# Patient Record
Sex: MALE | Race: OTHER | HISPANIC OR LATINO | URBAN - METROPOLITAN AREA
[De-identification: names, ages, dates, MRNs, and addresses within clinical notes are randomized per-mention and may not be internally consistent; named-entity substitution may affect disease eponyms.]

---

## 2019-07-01 ENCOUNTER — OUTPATIENT (OUTPATIENT)
Dept: OUTPATIENT SERVICES | Facility: HOSPITAL | Age: 19
LOS: 1 days | End: 2019-07-01
Payer: MEDICAID

## 2019-07-01 PROCEDURE — G9001: CPT

## 2019-07-03 DIAGNOSIS — Z71.89 OTHER SPECIFIED COUNSELING: ICD-10-CM

## 2019-07-31 ENCOUNTER — OUTPATIENT (OUTPATIENT)
Dept: OUTPATIENT SERVICES | Facility: HOSPITAL | Age: 19
LOS: 1 days | Discharge: HOME | End: 2019-07-31
Payer: MEDICAID

## 2019-07-31 VITALS
HEIGHT: 69 IN | WEIGHT: 154.32 LBS | TEMPERATURE: 99 F | SYSTOLIC BLOOD PRESSURE: 116 MMHG | OXYGEN SATURATION: 97 % | DIASTOLIC BLOOD PRESSURE: 58 MMHG | HEART RATE: 83 BPM | RESPIRATION RATE: 18 BRPM

## 2019-07-31 DIAGNOSIS — Z98.890 OTHER SPECIFIED POSTPROCEDURAL STATES: Chronic | ICD-10-CM

## 2019-07-31 DIAGNOSIS — M87.9 OSTEONECROSIS, UNSPECIFIED: ICD-10-CM

## 2019-07-31 DIAGNOSIS — Z01.818 ENCOUNTER FOR OTHER PREPROCEDURAL EXAMINATION: ICD-10-CM

## 2019-07-31 LAB
ALBUMIN SERPL ELPH-MCNC: 4.7 G/DL — SIGNIFICANT CHANGE UP (ref 3.5–5.2)
ALP SERPL-CCNC: 66 U/L — SIGNIFICANT CHANGE UP (ref 30–115)
ALT FLD-CCNC: 11 U/L — SIGNIFICANT CHANGE UP (ref 0–41)
ANION GAP SERPL CALC-SCNC: 13 MMOL/L — SIGNIFICANT CHANGE UP (ref 7–14)
APPEARANCE UR: CLEAR — SIGNIFICANT CHANGE UP
APTT BLD: 30.6 SEC — SIGNIFICANT CHANGE UP (ref 27–39.2)
AST SERPL-CCNC: 22 U/L — SIGNIFICANT CHANGE UP (ref 0–41)
BASOPHILS # BLD AUTO: 0.01 K/UL — SIGNIFICANT CHANGE UP (ref 0–0.2)
BASOPHILS NFR BLD AUTO: 0.1 % — SIGNIFICANT CHANGE UP (ref 0–1)
BILIRUB SERPL-MCNC: 12.6 MG/DL — HIGH (ref 0.2–1.2)
BILIRUB UR-MCNC: NEGATIVE — SIGNIFICANT CHANGE UP
BUN SERPL-MCNC: 12 MG/DL — SIGNIFICANT CHANGE UP (ref 10–20)
CALCIUM SERPL-MCNC: 9.5 MG/DL — SIGNIFICANT CHANGE UP (ref 8.5–10.1)
CHLORIDE SERPL-SCNC: 100 MMOL/L — SIGNIFICANT CHANGE UP (ref 98–110)
CO2 SERPL-SCNC: 28 MMOL/L — SIGNIFICANT CHANGE UP (ref 17–32)
COLOR SPEC: YELLOW — SIGNIFICANT CHANGE UP
CREAT SERPL-MCNC: 0.9 MG/DL — SIGNIFICANT CHANGE UP (ref 0.7–1.5)
DIFF PNL FLD: NEGATIVE — SIGNIFICANT CHANGE UP
EOSINOPHIL # BLD AUTO: 0.3 K/UL — SIGNIFICANT CHANGE UP (ref 0–0.7)
EOSINOPHIL NFR BLD AUTO: 3.3 % — SIGNIFICANT CHANGE UP (ref 0–8)
GLUCOSE SERPL-MCNC: 71 MG/DL — SIGNIFICANT CHANGE UP (ref 70–99)
GLUCOSE UR QL: NEGATIVE — SIGNIFICANT CHANGE UP
HCT VFR BLD CALC: 31.7 % — LOW (ref 42–52)
HGB BLD-MCNC: 10.9 G/DL — LOW (ref 14–18)
IMM GRANULOCYTES NFR BLD AUTO: 0.3 % — SIGNIFICANT CHANGE UP (ref 0.1–0.3)
INR BLD: 1.2 RATIO — SIGNIFICANT CHANGE UP (ref 0.65–1.3)
KETONES UR-MCNC: NEGATIVE — SIGNIFICANT CHANGE UP
LEUKOCYTE ESTERASE UR-ACNC: NEGATIVE — SIGNIFICANT CHANGE UP
LYMPHOCYTES # BLD AUTO: 4.22 K/UL — HIGH (ref 1.2–3.4)
LYMPHOCYTES # BLD AUTO: 46.9 % — SIGNIFICANT CHANGE UP (ref 20.5–51.1)
MCHC RBC-ENTMCNC: 31.4 PG — HIGH (ref 27–31)
MCHC RBC-ENTMCNC: 34.4 G/DL — SIGNIFICANT CHANGE UP (ref 32–37)
MCV RBC AUTO: 91.4 FL — SIGNIFICANT CHANGE UP (ref 80–94)
MONOCYTES # BLD AUTO: 1.31 K/UL — HIGH (ref 0.1–0.6)
MONOCYTES NFR BLD AUTO: 14.6 % — HIGH (ref 1.7–9.3)
NEUTROPHILS # BLD AUTO: 3.12 K/UL — SIGNIFICANT CHANGE UP (ref 1.4–6.5)
NEUTROPHILS NFR BLD AUTO: 34.8 % — LOW (ref 42.2–75.2)
NITRITE UR-MCNC: NEGATIVE — SIGNIFICANT CHANGE UP
NRBC # BLD: 0 /100 WBCS — SIGNIFICANT CHANGE UP (ref 0–0)
PH UR: 6.5 — SIGNIFICANT CHANGE UP (ref 5–8)
PLATELET # BLD AUTO: 489 K/UL — HIGH (ref 130–400)
POTASSIUM SERPL-MCNC: 4.9 MMOL/L — SIGNIFICANT CHANGE UP (ref 3.5–5)
POTASSIUM SERPL-SCNC: 4.9 MMOL/L — SIGNIFICANT CHANGE UP (ref 3.5–5)
PROT SERPL-MCNC: 7.2 G/DL — SIGNIFICANT CHANGE UP (ref 6–8)
PROT UR-MCNC: NEGATIVE — SIGNIFICANT CHANGE UP
PROTHROM AB SERPL-ACNC: 13.8 SEC — HIGH (ref 9.95–12.87)
RBC # BLD: 3.47 M/UL — LOW (ref 4.7–6.1)
RBC # FLD: 17.2 % — HIGH (ref 11.5–14.5)
SODIUM SERPL-SCNC: 141 MMOL/L — SIGNIFICANT CHANGE UP (ref 135–146)
SP GR SPEC: 1.02 — SIGNIFICANT CHANGE UP (ref 1.01–1.02)
UROBILINOGEN FLD QL: ABNORMAL
WBC # BLD: 8.99 K/UL — SIGNIFICANT CHANGE UP (ref 4.8–10.8)
WBC # FLD AUTO: 8.99 K/UL — SIGNIFICANT CHANGE UP (ref 4.8–10.8)

## 2019-07-31 PROCEDURE — 73502 X-RAY EXAM HIP UNI 2-3 VIEWS: CPT | Mod: 26,LT

## 2019-07-31 PROCEDURE — 93010 ELECTROCARDIOGRAM REPORT: CPT

## 2019-07-31 NOTE — H&P PST ADULT - NSICDXFAMILYHX_GEN_ALL_CORE_FT
FAMILY HISTORY:  Family history of diabetes mellitus (DM)  Family history of sickle cell trait, mother and father

## 2019-07-31 NOTE — H&P PST ADULT - NSICDXPASTSURGICALHX_GEN_ALL_CORE_FT
PAST SURGICAL HISTORY:  History of surgery spleenectomy, right hip core decompression , cholecystectomy

## 2019-08-01 LAB
ESTIMATED AVERAGE GLUCOSE: <68 MG/DL — SIGNIFICANT CHANGE UP (ref 68–114)
HBA1C BLD-MCNC: <4 % — LOW (ref 4–5.6)

## 2019-08-02 LAB
CULTURE RESULTS: SIGNIFICANT CHANGE UP
SPECIMEN SOURCE: SIGNIFICANT CHANGE UP

## 2019-08-05 LAB — MRSA PCR RESULT.: ABNORMAL

## 2019-08-06 PROBLEM — D57.1 SICKLE-CELL DISEASE WITHOUT CRISIS: Chronic | Status: ACTIVE | Noted: 2019-07-31

## 2019-08-07 ENCOUNTER — TRANSCRIPTION ENCOUNTER (OUTPATIENT)
Age: 19
End: 2019-08-07

## 2019-08-23 ENCOUNTER — OUTPATIENT (OUTPATIENT)
Dept: OUTPATIENT SERVICES | Facility: HOSPITAL | Age: 19
LOS: 1 days | Discharge: HOME | End: 2019-08-23

## 2019-08-23 VITALS
SYSTOLIC BLOOD PRESSURE: 112 MMHG | TEMPERATURE: 99 F | RESPIRATION RATE: 19 BRPM | HEART RATE: 85 BPM | DIASTOLIC BLOOD PRESSURE: 65 MMHG | OXYGEN SATURATION: 97 %

## 2019-08-23 VITALS
SYSTOLIC BLOOD PRESSURE: 128 MMHG | DIASTOLIC BLOOD PRESSURE: 87 MMHG | HEIGHT: 69 IN | WEIGHT: 154.98 LBS | RESPIRATION RATE: 18 BRPM | HEART RATE: 81 BPM | TEMPERATURE: 98 F

## 2019-08-23 DIAGNOSIS — Z98.890 OTHER SPECIFIED POSTPROCEDURAL STATES: Chronic | ICD-10-CM

## 2019-08-23 RX ORDER — ONDANSETRON 8 MG/1
4 TABLET, FILM COATED ORAL ONCE
Refills: 0 | Status: DISCONTINUED | OUTPATIENT
Start: 2019-08-23 | End: 2019-08-26

## 2019-08-23 RX ORDER — OXYCODONE AND ACETAMINOPHEN 5; 325 MG/1; MG/1
1 TABLET ORAL ONCE
Refills: 0 | Status: DISCONTINUED | OUTPATIENT
Start: 2019-08-23 | End: 2019-08-26

## 2019-08-23 RX ORDER — SODIUM CHLORIDE 9 MG/ML
1000 INJECTION, SOLUTION INTRAVENOUS
Refills: 0 | Status: DISCONTINUED | OUTPATIENT
Start: 2019-08-23 | End: 2019-08-26

## 2019-08-23 RX ORDER — MORPHINE SULFATE 50 MG/1
2 CAPSULE, EXTENDED RELEASE ORAL
Refills: 0 | Status: DISCONTINUED | OUTPATIENT
Start: 2019-08-23 | End: 2019-08-26

## 2019-08-23 RX ORDER — FOLIC ACID 0.8 MG
1 TABLET ORAL
Qty: 0 | Refills: 0 | DISCHARGE

## 2019-08-23 RX ORDER — HYDROMORPHONE HYDROCHLORIDE 2 MG/ML
0.5 INJECTION INTRAMUSCULAR; INTRAVENOUS; SUBCUTANEOUS
Refills: 0 | Status: DISCONTINUED | OUTPATIENT
Start: 2019-08-23 | End: 2019-08-26

## 2019-08-23 RX ORDER — HYDROXYUREA 500 MG/1
0 CAPSULE ORAL
Qty: 0 | Refills: 0 | DISCHARGE

## 2019-08-23 RX ADMIN — SODIUM CHLORIDE 100 MILLILITER(S): 9 INJECTION, SOLUTION INTRAVENOUS at 20:42

## 2019-08-23 RX ADMIN — MORPHINE SULFATE 2 MILLIGRAM(S): 50 CAPSULE, EXTENDED RELEASE ORAL at 22:00

## 2019-08-23 RX ADMIN — MORPHINE SULFATE 2 MILLIGRAM(S): 50 CAPSULE, EXTENDED RELEASE ORAL at 21:39

## 2019-08-23 NOTE — ASU DISCHARGE PLAN (ADULT/PEDIATRIC) - CALL YOUR DOCTOR IF YOU HAVE ANY OF THE FOLLOWING:
Pain not relieved by Medications/Swelling that gets worse/Nausea and vomiting that does not stop/Numbness, tingling, color or temperature change to extremity/Fever greater than (need to indicate Fahrenheit or Celsius)/Bleeding that does not stop/Wound/Surgical Site with redness, or foul smelling discharge or pus

## 2019-08-23 NOTE — ASU DISCHARGE PLAN (ADULT/PEDIATRIC) - CARE PROVIDER_API CALL
Kt Gan)  Orthopaedic Surgery  [address] [place] [address]  Phone: [phone]  Fax: [phone]  Follow Up Time:

## 2019-08-23 NOTE — CHART NOTE - NSCHARTNOTEFT_GEN_A_CORE
PACU ANESTHESIA ADMISSION NOTE      Procedure: Core decompression of hip    Post op diagnosis:  Osteonecrosis of left hip      ____  Intubated  TV:______       Rate: ______      FiO2: ______    __x__  Patent Airway    ____  Full return of protective reflexes    _x___  Full recovery from anesthesia / back to baseline status    Vitals:  T(F): 98.3  HR: 70  BP: 119/58  RR: 20  SpO2: 100%    Mental Status:  _x___ Awake   _x___ Alert   _____ Drowsy   _____ Sedated    Nausea/Vomiting:  ___x_ NO  ______Yes,   See Post - Op Orders          Pain Scale (0-10):  _____    Treatment: ____ None    __x__ See Post - Op/PCA Orders    Post - Operative Fluids:   ____ Oral   ___x_ See Post - Op Orders    Plan: Discharge:   _x___Home       _____Floor     _____Critical Care    _____  Other:_________________    Comments: patient tolerated procedure  Na anesthesia complications  Transfer to PACU

## 2019-08-23 NOTE — BRIEF OPERATIVE NOTE - OPERATION/FINDINGS
osteonecrosis of left hip, 3 drill passes into the osteonecrotic portion of the femoral head were made

## 2019-08-28 DIAGNOSIS — Z88.8 ALLERGY STATUS TO OTHER DRUGS, MEDICAMENTS AND BIOLOGICAL SUBSTANCES STATUS: ICD-10-CM

## 2019-08-28 DIAGNOSIS — D57.1 SICKLE-CELL DISEASE WITHOUT CRISIS: ICD-10-CM

## 2019-08-28 DIAGNOSIS — M87.852 OTHER OSTEONECROSIS, LEFT FEMUR: ICD-10-CM
